# Patient Record
Sex: MALE | Race: WHITE
[De-identification: names, ages, dates, MRNs, and addresses within clinical notes are randomized per-mention and may not be internally consistent; named-entity substitution may affect disease eponyms.]

---

## 2018-12-31 ENCOUNTER — HOSPITAL ENCOUNTER (OUTPATIENT)
Dept: HOSPITAL 96 - M.RAD | Age: 78
End: 2018-12-31
Attending: INTERNAL MEDICINE
Payer: COMMERCIAL

## 2018-12-31 DIAGNOSIS — M47.22: Primary | ICD-10-CM

## 2019-02-01 ENCOUNTER — HOSPITAL ENCOUNTER (OUTPATIENT)
Dept: HOSPITAL 96 - M.RAD | Age: 79
End: 2019-02-01
Attending: INTERNAL MEDICINE
Payer: COMMERCIAL

## 2019-02-01 DIAGNOSIS — M19.042: Primary | ICD-10-CM

## 2019-02-01 DIAGNOSIS — M19.041: ICD-10-CM

## 2019-02-07 ENCOUNTER — HOSPITAL ENCOUNTER (OUTPATIENT)
Dept: HOSPITAL 96 - M.ULTRA | Age: 79
End: 2019-02-07
Attending: INTERNAL MEDICINE
Payer: COMMERCIAL

## 2019-02-07 DIAGNOSIS — R20.0: ICD-10-CM

## 2019-02-07 DIAGNOSIS — M79.601: Primary | ICD-10-CM

## 2019-02-07 DIAGNOSIS — M79.602: ICD-10-CM

## 2019-06-11 ENCOUNTER — HOSPITAL ENCOUNTER (OUTPATIENT)
Dept: HOSPITAL 96 - M.NUC | Age: 79
End: 2019-06-11
Attending: INTERNAL MEDICINE
Payer: COMMERCIAL

## 2019-06-11 DIAGNOSIS — I25.2: ICD-10-CM

## 2019-06-11 DIAGNOSIS — Z79.899: ICD-10-CM

## 2019-06-11 DIAGNOSIS — I25.10: Primary | ICD-10-CM

## 2019-06-11 DIAGNOSIS — Z95.5: ICD-10-CM

## 2019-06-11 NOTE — CARDNUC
[place] [address]
Phone:  [phone]                     CARDIAC NUCLEAR IMAGING REPORT
_______________________________________________________________________________
 
Name:         SONIA MARTIN                Room:                     REG CLI
M.R.#:    [id number]     Account #:     [id number]  
Admission:    06/11/19    Attend Phys:   KRIS Salinas
Discharge:                Date of Birth: [birth year]  
Date of Service: 06/11/19 1513  Report #:      [id number]
        [id number] 
_______________________________________________________________________________
THIS REPORT FOR:  //name//                      
 
 
--------------- APPROVED REPORT --------------
 
 
Study performed:  06/11/2019 09:16:28
 
Exam: Nuclear Stress Test
Indication: CAD, dizziness
Patient Location: Out-Patient
Stress Tech: Radha Rojas
Stress Nurse: Mini TOTH Tech:LJ Asif
 
Ht: 5 ft 10 in     Wt: 194 lbs    BSA:  2.06 m2
HR: 66 bpm                       BP: 151/90 mmHg          BMI:  
27.83
 
Medical History
Medical History: CAD s/p MI
Cardiac Risk Factors: Hyperlipidemia, Age, HTN, Tobacco History 
(Former)
Previous Cardiac Procedures: PCI
 
Stress Test Details
Stress Test:  Pharmacologic stress testing performed using 0.4 mg of 
regadenoson per 5 mL given IV over 10 seconds.      
  Reason for pharmacologic stress test: physical 
limitation.      
HR           
Resting HR:            66 bpm   Max Heart Rate (APMHR): 143 bpm  
Max HR Achieved:  90 bpm   Target HR (85% APMHR): 121 bpm  
% of APMHR:         62         
Recovery HR:            75 bpm        
HR response to stress: Normal HR response to stress      
 
BP           
Resting BP:  151/90 mmHg        
Max BP:       139/83 mmHg        
 
BP response to stress: Normal blood pressure response to 
stress.      
ECG           
Resting ECG:  Sinus Rhythm        
Stress ECG:     Sinus Rhythm        
 
 
[place] [address]
Phone:  [phone]                     CARDIAC NUCLEAR IMAGING REPORT
_______________________________________________________________________________
 
Name:         SONIA MARTIN                Room:                     REG CLI
M.R.#:    [id number]     Account #:     [id number]  
Admission:    06/11/19    Attend Phys:   KRIS Salinas
Discharge:                Date of Birth: [birth year]  
Date of Service: 06/11/19 1513  Report #:      [id number]
        [id number] 
_______________________________________________________________________________
ST Change: None          
Arrhythmia:    None         
Recovery ECG: Sinus Rhythm        
Recovery ST Change: None        
Recovery Arrhythmia: None        
 
Clinical
Reason for Termination: Completed protocol
Stress Symptoms: None
Exercise capacity: 1.0 METs
The patient Lexiscan infusion without significant symptoms.
 
Stress ECG Conclusion
The baseline 12-lead EKG shows sinus rhythm without significant ST or 
T wave abnormality. EKGs obtained during Lexiscan infusion show sinus 
rhythm with no significant ST or T wave changes when compared to 
baseline. There were no stress-induced arrhythmias.
 
NM EXAM: Myocardial Perfusion REST/STRESS
Imaging Protocol: Rest Tc-99m/Stress Tc-99m 1 day
 
Resting Data
Rest SPECT myocardial perfusion imaging was performed in supine 
position 30 minutes following the intravenous injection of 10.5 mCi 
of Tc-99m Sestamibi.
Time of rest injection: 0800     Date: 06/11/2019
The images were gated to evaluate regional wall motion and calculate 
left ventricular ejection fraction. 
Administration Route: IV
Administration Site: Right AC
 
Pharmacologic Stress
Pharmacologic stress test was performed by injecting Regadenoson 0.4 
mg IV push followed by the intravenous injection of 34.8 mCi of 
Tc-99m Sestamibi.
Time of stress injection: 0930     Date: 06/11/2019
Administration Route: IV
Administration Site: Right AC
Gated Stress SPECT was performed 40 minutes after stress 
injection.
The images were gated to evaluate regional wall motion and calculate 
left ventricular ejection fraction. 
Prone imaging was performed.
 
Study Quality
Study: [place] [address]
Phone:  [phone]                     CARDIAC NUCLEAR IMAGING REPORT
_______________________________________________________________________________
 
Name:         SONIA MARTIN                Room:                     REG CLI
M.R.#:    [id number]     Account #:     [id number]  
Admission:    06/11/19    Attend Phys:   KRIS Salinas
Discharge:                Date of Birth: [birth year]  
Date of Service: 06/11/19 1513  Report #:      [id number]
        [id number] 
_______________________________________________________________________________
Artifact: Moderate Diaphragmatic artifact
 
Study Data
At rest, the left ventricular ejection fraction was 53%..   
Post stress, the left ventricular ejection was 52%..   
TID = 1.04.       
 
Perfusion
Perfusion images in the supine position at rest and post Lexiscan 
stress show a large region of photopenia involving the basal to 
distal inferior wall that resolves partially in the mid to distal 
portion on the post stress prone imaging suggesting somnolent of 
diaphragmatic motion artifact. There appears to be persistent 
photopenia on prone images in the basal inferior wall. No reversible 
defects are identified.
 
Wall Motion
There is hypokinesis of the basal inferior wall. No other focal wall 
motion abnormalities noted.
 
Nuclear Conclusion
ECG Findings: negative for ischemia
Clinical Findings: negative for ischemia
Nuclear Findings: negative for ischemia
Exercise Capacity: not assessed
Left Ventricular Function: abnormal
Risk Study: low
Myocardial perfusion images suggest prior infarct of the basal 
inferior wall. There were no reversible defects to suggest ongoing 
ischemia. Global LV systolic function is fairly well-preserved. This 
is not a high risk study. 
 
<Conclusion>
The baseline 12-lead EKG shows sinus rhythm without significant ST or 
T wave abnormality. EKGs obtained during Lexiscan infusion show sinus 
rhythm with no significant ST or T wave changes when compared to 
baseline. There were no stress-induced arrhythmias.
 
 
 
 
 
 
 
  <ELECTRONICALLY SIGNED>
                                           By: Arash Mata MD, FACC   
  06/11/19     1513
D: 06/11/19 1513   _____________________________________
T: 06/11/19 1513   Arash Mata MD, FACC     /INF

## 2020-01-16 ENCOUNTER — HOSPITAL ENCOUNTER (OUTPATIENT)
Dept: HOSPITAL 96 - M.NUC | Age: 80
End: 2020-01-16
Attending: INTERNAL MEDICINE
Payer: MEDICARE

## 2020-01-16 DIAGNOSIS — I25.5: Primary | ICD-10-CM

## 2020-01-16 DIAGNOSIS — Z95.5: ICD-10-CM

## 2020-01-16 DIAGNOSIS — I10: ICD-10-CM

## 2020-01-16 DIAGNOSIS — J44.9: ICD-10-CM

## 2020-01-16 DIAGNOSIS — Z79.899: ICD-10-CM

## 2020-01-16 DIAGNOSIS — I25.10: ICD-10-CM

## 2020-01-16 NOTE — CARDNUC
[place] [address]
Phone:  [phone]                     CARDIAC NUCLEAR IMAGING REPORT
_______________________________________________________________________________
 
Name:         SONIA MARTIN                Room:                     REG CLI
M.R.#:    [id number]     Account #:     [id number]  
Admission:    01/16/20    Attend Phys:   KRIS Salinas
Discharge:                Date of Birth: [birth year]  
Date of Service: 01/16/20 1900  Report #:      [id number]
        [id number] 
_______________________________________________________________________________
THIS REPORT FOR:  //name//                      
 
 
--------------- APPROVED REPORT --------------
 
 
Study performed:  01/16/2020 07:45:00
 
Indication: Cardiomyopathy, routine
Patient Location: Out-Patient
Stress Tech: Radha Rojas
Stress Nurse: Thalia Blum RN
 
Ht: 5 ft 7 in  Wt: 185 lbs  BSA:  1.96 m2
    BMI:  28.97
 
Medical History
Medical History: pat, copd, cardiomyopathy htn
Medications: asa-81, atorvastatin, clopidogrel, diltiazem, 
ntg
Allergies: naprosyn
Cardiac Risk Factors: age, htn, former smoker
Previous Cardiac Procedures: pci
Exercise History: Sedentary
 
Resting Data
Rest SPECT myocardial perfusion imaging was performed in supine 
position 30 minutes following the intravenous injection of 11.2 mCi 
of Tc-99m Sestamibi.
Time of rest injection: 08:05     
The images were gated to evaluate regional wall motion and calculate 
left ventricular ejection fraction. 
Administration Route: IV
Administration Site: Left AC
 
Pharmacologic Stress
Pharmacologic stress test was performed by injecting Regadenoson 0.4 
mg IV push over 10-15 seconds immediately followed by the intravenous 
injection of 32.7 mCi of Tc-99m Sestamibi.
Time of stress injection: 09:35     
Administration Route: IV
Administration Site: Left AC
Heart Rate at time of stress injection: 90 bpm.
Gated Stress SPECT was performed 45 minutes after stress 
injection.
The images were gated to evaluate regional wall motion and calculate 
 
 
[place] [address]
Phone:  [phone]                     CARDIAC NUCLEAR IMAGING REPORT
_______________________________________________________________________________
 
Name:         SONIA MARTIN                Room:                     REG CLI
M.R.#:    [id number]     Account #:     [id number]  
Admission:    01/16/20    Attend Phys:   KRIS Salinas
Discharge:                Date of Birth: [birth year]  
Date of Service: 01/16/20 1900  Report #:      [id number]
        [id number] 
_______________________________________________________________________________
left ventricular ejection fraction. 
Prone imaging was performed.
 
Stress Test Details
Stress Test:  Pharmacologic stress testing performed using 0.4 mg of 
regadenoson per 5 mL given IV over 10 seconds.      
  Reason for pharmacologic stress test: physical limitation.      
 
HR      Max Heart Rate (APMHR): 142 bpm  
Resting HR:            71 bpm   Target HR (85% APMHR): 120 bpm  
Max HR Achieved:  90 bpm        
% of APMHR:         63         
Recovery HR:            80 bpm        
 
BP           
Resting BP:  126/85 mmHg        
Max BP:       126/69 mmHg        
Recovery BP:       126/69 mmHg        
ECG           
Resting ECG:  Sinus Rhythm        
Stress ECG:     Sinus Rhythm        
ST Change: None          
Arrhythmia:    None         
Recovery ECG: Sinus Rhythm        
Recovery ST Change: None        
Recovery Arrhythmia: None        
 
Clinical
Reason for Termination: Completed protocol
Exercise duration: 0 min  sec
Exercise capacity: 1 METs
The patient tolerated Lexiscan infusion without significant cardiac 
symptoms.
 
Nurse Comments
pt in generalized weaked condition and cannot walk on 
treadmill
 
Stress ECG Conclusion
The baseline 12-lead EKG show sinus rhythm without significant ST or 
T wave abnormality. EKGs obtained during and post Lexiscan infusion 
show sinus rhythm no significant ST segment changes when compared 
baseline. There were no stress-induced arrhythmias.
 
Study Quality
Study: [place] [address]
Phone:  [phone]                     CARDIAC NUCLEAR IMAGING REPORT
_______________________________________________________________________________
 
Name:         SONIA MARTIN                Room:                     REG CLI
M.R.#:    [id number]     Account #:     [id number]  
Admission:    01/16/20    Attend Phys:   KRIS Salinas
Discharge:                Date of Birth: [birth year]  
Date of Service: 01/16/20 1900  Report #:      [id number]
        [id number] 
_______________________________________________________________________________
Artifact: Mild Diaphragmatic artifact
 
Study Data
At rest, the left ventricular ejection fraction was 47%..   
Post stress, the left ventricular ejection was 54%..   
TID = 0.97.       
 
Perfusion
Perfusion images show a moderate size severe intensity fixed defect 
involving the inferior wall consistent with prior infarct. No 
reversible defects were identified.
 
Wall Motion
Gated studies show wall motion abnormalities involving the basal mid 
inferior wall. Global LV systolic function is mildly 
decreased.
 
Nuclear Conclusion
ECG Findings: negative for ischemia 
Clinical Findings: negative for ischemia 
Nuclear Findings: negative for ischemia 
Exercise Capacity: not assessed
Left Ventricular Function: abnormal 
Myocardial perfusion images show evidence of prior inferior wall 
infarct. Global LV systolic function is mildly decreased. This is not 
a high risk study. There is no evidence of stress-induced ischemia. 
 
<Conclusion>
The baseline 12-lead EKG show sinus rhythm without significant ST or 
T wave abnormality. EKGs obtained during and post Lexiscan infusion 
show sinus rhythm no significant ST segment changes when compared 
baseline. There were no stress-induced arrhythmias.
 
 
 
 
 
 
 
 
 
 
 
 
  <ELECTRONICALLY SIGNED>
                                           By: Arash Mata MD, FACC   
  01/16/20 1900
D: 01/16/20 1900   _____________________________________
T: 01/16/20 1900   Arash Mata MD, FACC     /INF

## 2020-12-07 NOTE — 2DMMODE
[place] [address]
Phone:  [phone] 2 D/M-MODE ECHOCARDIOGRAM     
_______________________________________________________________________________
 
Name:         MARIOSONIA C                Room:                     REG CLI
M.R.#:    [id number]     Account #:     [id number]  
Admission:    20    Attend Phys:   Bhavin Torres MD  
Discharge:                Date of Birth: [birth year]  
Date of Service: 20 1151  Report #:      [id number]
        [id number]
_______________________________________________________________________________
THIS REPORT FOR:
 
cc:  Hellen Jones MD, Lin W. MD Blick, David R. MD [facility]        
                                                                       ~
 
--------------- APPROVED REPORT --------------
 
 
Study performed:  2020 08:58:01
 
EXAM: Comprehensive 2D, Doppler, and color-flow 
Echocardiogram 
Patient Location: Out-Patient   
 
      BSA:         1.98
HR: 94 bpm BP:          122/70 mmHg 
 
Other Information 
Study Quality: Good
 
Indications
Dyspnea 
 
2D Dimensions
IVSd:  10.21 (7-11mm) LVOT Diam:  20.40 (18-24mm) 
LVDd:  57.98 mm  
PWd:  10.77 (7-11mm) Ascending Ao:  32.41 (22-36mm)
LVDs:  36.59 (25-40mm) 
Aortic Root:  36.47 mm 
 
Volumes
Left Atrial Volume (Systole) 
    LA ESV Index:  24.80 mL/m2
 
Aortic Valve
AoV Peak Jeffery.:  1.78 m/s 
AO Peak Gr.:  12.74 mmHg LVOT Max P.97 mmHg
AO Mean Gr.:  6.27 mmHg  LVOT Mean P.60 mmHg
    LVOT Max V:  1.11 m/s
AO V2 VTI:  33.32 cm  LVOT Mean V:  0.75 m/s
YOLANDE (VTI):  2.17 cm2  LVOT V1 VTI:  22.07 cm
 
Mitral Valve
    E/A Ratio:  0.77
 
 
[place] [address]
Phone:  [phone]                     2 D/M-MODE ECHOCARDIOGRAM     
_______________________________________________________________________________
 
Name:         SONIA MARTIN                Room:                     REG CLI
M.R.#:    [id number]     Account #:     [id number]  
Admission:    20    Attend Phys:   Bhavin Torres MD  
Discharge:                Date of Birth: [birth year]  
Date of Service: 20 1151  Report #:      [id number]
        [id number]
_______________________________________________________________________________
    MV Decel. Time:  211.34 ms
MV E Max Jeffery.:  0.97 m/s 
MV PHT:  61.29 ms  
MVA (PHT):  3.59 cm2  
 
TDI
E/Lateral E':  9.70 E/Medial E':  12.13
   Medial E' Jeffery.:  0.08 m/s
   Lateral E' Jeffery.:  0.10 m/s
 
Pulmonary Valve
PV Peak Jeffery.:  1.10 m/s PV Peak Gr.:  4.84 mmHg
 
Tricuspid Valve
    RAP Estimate:  5.00 mmHg
TR Peak Gr.:  28.01 mmHg RVSP:  33.01 mmHg
    PA Pressure:  33.01 mmHg
 
Left Ventricle
The left ventricle is normal size. There is normal LV segmental wall 
motion. There is normal left ventricular wall thickness. Left 
ventricular systolic function is normal. The left ventricular 
ejection fraction is within the normal range. LVEF is 55-60%. Grade I 
- abnormal relaxation pattern.
 
Right Ventricle
The right ventricle is normal size. The right ventricular systolic 
function is normal.
 
Atria
The left atrium size is normal. The right atrium size is 
normal.
 
Aortic Valve
The Aortic valve is sclerotic. No aortic regurgitation is present. 
There is no aortic valvular stenosis.
 
Mitral Valve
The mitral valve is normal in structure. Mild mitral regurgitation. 
No evidence of mitral valve stenosis.
 
Tricuspid Valve
The tricuspid valve is normal in structure. Mild tricuspid 
regurgitation.
 
Pulmonic Valve
 
 
[place] [address]
Phone:  [phone]                     2 D/M-MODE ECHOCARDIOGRAM     
_______________________________________________________________________________
 
Name:         SONIA MARTIN                Room:                     REG CLI
M.R.#:    [id number]     Account #:     [id number]  
Admission:    20    Attend Phys:   Bhavin Torres MD  
Discharge:                Date of Birth: [birth year]  
Date of Service: 20 1151  Report #:      [id number]
        [id number]
_______________________________________________________________________________
Pulmonic valve is not well visualized. There is no pulmonic valvular 
regurgitation.
 
Great Vessels
The aortic root is normal in size. IVC is normal in size and 
collapses >50% with inspiration.
 
Pericardium
There is no pericardial effusion.
 
<Conclusion>
LVEF is 55-60%.
The Aortic valve is sclerotic.
Mild mitral regurgitation.
 
 
 
 
 
 
 
 
 
 
 
 
 
 
 
 
 
 
 
 
 
 
 
 
 
 
 
 
 
 
  <ELECTRONICALLY SIGNED>
                                           By: Saúl Humphrey MD, FACC      
  20     1151
D: 20 115   _____________________________________
T: 20 115   Saúl Humphrey MD, FACC        /INF

## 2020-12-27 NOTE — CON
[address]  [address]                    CONSULTATION                  
_______________________________________________________________________________
 
Name:       SONIA MARTIN                 Room:           [address]    ADM [place]  
M.R.#:  [id number]      Account #:      [id number]  
Admission:  12/27/20     Attend Phys:    Fazal Yan MD 
Discharge:               Date of Birth:  [birth year]  
         Report #: [id number]
                                                                     [id number]  
_______________________________________________________________________________
THIS REPORT FOR:  
 
cc:  Hellen Jones MD, Lin W. MD                                                      ~
     Qamar Jones MD               
 
 
CONSULTING PHYSICIAN:  Fazla Yan MD
 
REASON FOR CONSULTATION:  Acute kidney injury.
 
HISTORY OF PRESENT ILLNESS:  A 80-year-old gentleman who has a recent diagnosis
of what he tells me is lung cancer with metastatic disease to his liver and
intestines.  He follows with Dr. Pisano and tells me that he recently started
some injections, but does not know the name of them.  He had the first to 4.  He
comes in with some progressive lower extremity swelling.  He tells me he has
been drinking lots of tomato juice like fluids as he had lost some of his desire
to eat and drink other foods.  He had also been using a lot of salt.  Denies any
NSAID use or other new medications.  He has no other complaints at present time.
 
REVIEW OF SYSTEMS:  Constitutional, psych, heme, eyes, ENT, respiratory,
cardiac, GI, , endocrine, all negative except as documented above.
 
PAST MEDICAL HISTORY:  Lung cancer, coronary artery disease with history of
stent.
 
SOCIAL HISTORY:  No tobacco.
 
FAMILY HISTORY:  Pertinent in this 80-year-old gentleman.
 
CURRENT MEDICATIONS:  Reviewed.
 
PHYSICAL EXAMINATION:
VITAL SIGNS:  Blood pressure 141/82, pulse 94, respirations 18 and temperature
36.8.
GENERAL:  No acute distress.
EYES:  Open.
EARS:  Externally normal.
NECK:  Supple.
CARDIOVASCULAR:  Regular rate.
LUNGS:  Diminished breath sounds.
ABDOMEN:  Soft.
MUSCULOSKELETAL:  Bilateral lower extremity edema.
PSYCHIATRIC:  Awake, alert.
 
LABORATORY DATA:  White cell count 5.7, hemoglobin 9.5, platelets 325.  Sodium
 
 
 
[place] [address]                    CONSULTATION                  
_______________________________________________________________________________
 
Name:       SONIA MARTIN                 Room:           [address]#:  [id number]      Account #:      [id number]  
Admission:  12/27/20     Attend Phys:    Fazal Yan MD 
Discharge:               Date of Birth:  [birth year]  
         Report #: [id number]
                                                                     [id number]  
_______________________________________________________________________________
 
 
139, potassium 5.5, chloride 106, bicarbonate 23, BUN 70, creatinine 4.5,
glucose 138, calcium 7.8.
 
ASSESSMENT:
1.  Acute kidney injury with a 02/2020 creatinine of 0.8.  Creatinine is 4.5
here.  Kidneys were okay on CT scan.
2.  Hyperkalemia in the setting of drinking lots of tomato juice like fluids and
renal insufficiency also with evidence of constipation on CT imaging.
3.  History apparently of lung cancer with metastatic disease to his liver noted
on CT scan.  The patient tells me that he sees Dr. Torres and Dr. Pisano,
details are not available at this time.
4.  Lower extremity edema with negative lower extremity Doppler for deep venous
thrombosis.
5.  History of coronary artery disease and stents.
 
PLAN:
1.  We will obtain records from Oncology to clarify what type of malignancy he
has and what therapy, he has received for it.
2.  We will order a Veltassa.
3.  A 2 gram potassium dietary restriction.
4.  Check U/A.
5.  Check urine protein to creatinine ratio.
6.  Echocardiogram was ordered.  We will follow along with you.
 
Thank you for requesting my opinion in the care and management of this patient.
 
 
 
 
 
 
 
 
 
 
 
 
 
 
 
 
 
                       
                                        By:                                
                 
D: 12/28/20 1411_______________________________________
T: 12/28/20 1931Aaurora Jones MD                 /nt

## 2020-12-28 NOTE — EKG
[place] [address]
Phone:  [phone]                     ELECTROCARDIOGRAM REPORT      
_______________________________________________________________________________
 
Name:         SONIA MARTIN                Room:          [address]    ADM IN 
.R.#:    [id number]     Account #:     [id number]  
Admission:    20    Attend Phys:   Fazal Yan, 
Discharge:                Date of Birth: [birth year]  
Date of Service: 20 1610  Report #:      [id number]
        [id number]
_______________________________________________________________________________
THIS REPORT FOR:  //name//                      
 
                         [facility] ED
                                       
Test Date:    2020               Test Time:    16:10:30
Pat Name:     SONIA MARTIN             Department:   
Patient ID:   [id number]            Room:         [facility]
Gender:       M                        Technician:   
:          1940               Requested By: Vinh Wade
Order Number: [id number] MD:   Britton Banks
                                 Measurements
Intervals                              Axis          
Rate:         95                       P:            30
TX:           150                      QRS:          36
QRSD:         94                       T:            34
QT:           357                                    
QTc:          449                                    
                           Interpretive Statements
Sinus rhythm
Borderline low voltage, extremity leads
Compared to ECG 2017 08:59:20
Myocardial infarct finding no longer present
Electronically Signed On 2020 15:10:49 CST by Britton Banks
[email or web address]
 
 
 
 
 
 
 
 
 
 
 
 
 
 
 
 
 
 
 
 
  <ELECTRONICALLY SIGNED>
                                           By: Britton Banks MD, FAC     
  20     1510
D: 20   _____________________________________
T: 20   Britton Banks MD, [facility]       /EPI